# Patient Record
(demographics unavailable — no encounter records)

---

## 2025-07-15 NOTE — ASSESSMENT
[FreeTextEntry1] : Impression) gallstones  Plan) Long discussion with patient about clinical findings.  Objectively he has gallstones but the description of continual discomfort from July 4 to the present would be inconsistent with standard biliary colic.  His white blood cell count is normal and his had no fevers but the possibility of cholecystitis certainly exists (not seen on ultrasound done July 9).  Will send him for CT scan to see if there is anything else of concern.  If the CT scan shows inflammatory changes suggestive of cholecystitis we will have to be admitted for antibiotics and have more urgent surgery.  If the CT scan is negative I discussed with the patient that if we proceed with cholecystectomy there is a small chance that in fact his symptoms might not be related to this will still have some abdominal complaints even if we did the gallbladder removal.  We discussed the minimally invasive cholecystectomy risks and benefits which include but are not limited to postoperative bleeding, infection, biliary injury.  All questions answered.  We also discussed if he gets worse he should come to the emergency room to expedite evaluation.  In the meantime we will try to obtain an outpatient CT scan.  Milan Coulter MD  Chief Surgical Oncology Multidisciplinary GI cancer program [facility]  Professor Surgery [facility] [employer] [facility]   CC Dr. Zuniga  Time spent reviewing data and in consultation 40 minutes

## 2025-07-15 NOTE — ASSESSMENT
[FreeTextEntry1] : Impression) gallstones  Plan) Long discussion with patient about clinical findings.  Objectively he has gallstones but the description of continual discomfort from July 4 to the present would be inconsistent with standard biliary colic.  His white blood cell count is normal and his had no fevers but the possibility of cholecystitis certainly exists (not seen on ultrasound done July 9).  Will send him for CT scan to see if there is anything else of concern.  If the CT scan shows inflammatory changes suggestive of cholecystitis we will have to be admitted for antibiotics and have more urgent surgery.  If the CT scan is negative I discussed with the patient that if we proceed with cholecystectomy there is a small chance that in fact his symptoms might not be related to this will still have some abdominal complaints even if we did the gallbladder removal.  We discussed the minimally invasive cholecystectomy risks and benefits which include but are not limited to postoperative bleeding, infection, biliary injury.  All questions answered.  We also discussed if he gets worse he should come to the emergency room to expedite evaluation.  In the meantime we will try to obtain an outpatient CT scan.  Milan Coulter MD  Chief Surgical Oncology Multidisciplinary GI cancer program [facility]  Professor Surgery [facility] [employer] [facility]   CC Dr. Zuniga  Time spent reviewing data and in consultation 40 minutes

## 2025-07-15 NOTE — PHYSICAL EXAM
[Normal] : no peripheral adenopathy appreciated [de-identified] : anicteric [de-identified] : S1,S2, regular rate and rhythm. No murmurs heard.  [de-identified] : Clear throughout. No wheezes heard.  [de-identified] : Soft nondistended mild tenderness right side no palpable masses [de-identified] : warm and dry

## 2025-07-15 NOTE — HISTORY OF PRESENT ILLNESS
[de-identified] : Patient Name: CHRISTIANO BATES  MRN: [id number]  [place] MRN: Referring Provider: LUIS F POWELL,ANDREA PRINCE  Date: 07/15/2025   Diagnosis: gallstones  32 year old male presents for evaluation of gallstones. About 2 weeks ago he developed epigastric abdominal pain, nausea and feeling feverish. Symptoms began a few hours after eating some grilled chicken and thought his symptoms were related to what he ate.  7/9/25 - US showed a 1.0 cm gallstone. CBD 0.2cm.   Currently, Mr. BATES feels a sharp pain in his epigastric region and is unable to sleep. He has some relief with NSAIDs. He denies vomiting recently but did vomit a few days ago. He has had periods of diarrhea. He denies any documented fever.  Functional Status: Mr. BATES is able to walk up 2 flights of stairs without fatigue or dyspnea.

## 2025-07-15 NOTE — HISTORY OF PRESENT ILLNESS
Medication sent to pharmacy for PCP (Dr. Oreilly) who is [place].    Dr. Chicas   [de-identified] : Patient Name: CHRISTIANO BATES  MRN: [id number]  [place] MRN: Referring Provider: LUIS F POWELL,ANDREA PRINCE  Date: 07/15/2025   Diagnosis: gallstones  32 year old male presents for evaluation of gallstones. About 2 weeks ago he developed epigastric abdominal pain, nausea and feeling feverish. Symptoms began a few hours after eating some grilled chicken and thought his symptoms were related to what he ate.  7/9/25 - US showed a 1.0 cm gallstone. CBD 0.2cm.   Currently, Mr. BATES feels a sharp pain in his epigastric region and is unable to sleep. He has some relief with NSAIDs. He denies vomiting recently but did vomit a few days ago. He has had periods of diarrhea. He denies any documented fever.  Functional Status: Mr. BATES is able to walk up 2 flights of stairs without fatigue or dyspnea.

## 2025-07-15 NOTE — PHYSICAL EXAM
[Normal] : no peripheral adenopathy appreciated [de-identified] : anicteric [de-identified] : S1,S2, regular rate and rhythm. No murmurs heard.  [de-identified] : Clear throughout. No wheezes heard.  [de-identified] : Soft nondistended mild tenderness right side no palpable masses [de-identified] : warm and dry